# Patient Record
Sex: FEMALE | Race: WHITE
[De-identification: names, ages, dates, MRNs, and addresses within clinical notes are randomized per-mention and may not be internally consistent; named-entity substitution may affect disease eponyms.]

---

## 2018-04-06 ENCOUNTER — HOSPITAL ENCOUNTER (OUTPATIENT)
Dept: HOSPITAL 80 - F3N | Age: 33
Discharge: HOME | End: 2018-04-06
Attending: NEUROLOGICAL SURGERY
Payer: COMMERCIAL

## 2018-04-06 VITALS — DIASTOLIC BLOOD PRESSURE: 86 MMHG | SYSTOLIC BLOOD PRESSURE: 136 MMHG

## 2018-04-06 DIAGNOSIS — M51.26: ICD-10-CM

## 2018-04-06 DIAGNOSIS — M54.16: Primary | ICD-10-CM

## 2018-04-06 DIAGNOSIS — M21.372: ICD-10-CM

## 2018-04-06 PROCEDURE — 00NY0ZZ RELEASE LUMBAR SPINAL CORD, OPEN APPROACH: ICD-10-PCS | Performed by: NEUROLOGICAL SURGERY

## 2018-04-06 PROCEDURE — BR191ZZ FLUOROSCOPY OF LUMBAR SPINE USING LOW OSMOLAR CONTRAST: ICD-10-PCS | Performed by: NEUROLOGICAL SURGERY

## 2018-04-06 NOTE — POSTOPPROG
Post Op Note


Date of Operation: 04/06/18


Surgeon: JESSY Tao


Assistant: Ariella Godwin NP 


Anesthesia: GET(General Endotracheal)


Pre-op Diagnosis: HNP, lumbar stenosis 


Procedure: Left L4-5 CHLOÉ


Inf/Abcess present in the surg proc area at time of surgery?: No


Depth: Deep Incisional (Fascial)


EBL: 


Total fluids administered: see anesthesia 


Complications: 


none 





Date of Surgery: 04/06/18


Post Op Day: 0


Assessment/Plan: 


Assessment: 33 yr old s/p left L4-5 CHLOÉ for HNP





Plan:


-Discharge home when meets criteria


-Oxycodone 5mg and Robaxin 750mg x1 prior to discharge


-Please call neurosurgery with any questions/concerns 





Subjective: 


Patient waking up in PACU-left leg pain improved 


Objective: 


Waking up in PACU 


RLE 5/5, LLE 5/5 except left EHL 4/5


Sensation intact to light touch BLE


Dressing CDI 


Appropriate Neuro Check Frequency Ordered: Yes

## 2018-04-06 NOTE — GOP
[f rep st]



                                                                OPERATIVE REPORT





DATE OF OPERATION:  



SURGEON:  MICHELLE Tao MD



ASSISTANT:  Ariella Godwin NP



PREOPERATIVE DIAGNOSIS:  Left footdrop, left herniated nucleus pulposus, L4-5.



POSTOPERATIVE DIAGNOSIS:  Left footdrop, left herniated nucleus pulposus, L4-5.



PROCEDURE PERFORMED:  Left L4-5 hemilaminotomy with a microdiskectomy (21652), microscope, fluoroscop
y.



FINDINGS:  





ESTIMATED BLOOD LOSS:  25 cc.



INDICATIONS:  Ms. Brooks is a young woman who has a several month history of left footdrop and pain 
radiating down the left leg and it was unresponsive to conservative measures and she desired to have 
surgery.  She was having relentless pain that was not resolving and I suggested an operation.  The ri
sk of recurrent disk herniation, nerve injury and spinal fluid leak was discussed.  She knew there wa
s a slight risk of infection of the operative site.  She wanted to proceed.



DESCRIPTION OF PROCEDURE:  The patient was taken to the operating room, placed in supine position.  G
eneral anesthesia was begun.  She was flipped prone on the Reji frame.  Care was taken to pad all p
oints of contact.  Her back was sterilely prepped and draped in usual fashion.  A localizing x-ray wa
s taken.  We made a midline 18 mm incision above the L4-5 interspace.  The subcutaneous tissue was di
ssected using Bovie cautery down to the fascia.  Subperiosteal dissection was made down the left L4-5
 lamina.  A localizing x-ray was taken again.  The operating microscope was introduced.  We drilled a
 very minimal left inferior L4 laminotomy and then a rostral L5 laminotomy.  We did not interfere wit
h the facet joint.  We opened the ligamentum flavum and localized the L5 nerve root adjacent to the L
5 pedicle.  We swept it medially and underneath there was a very large free fragment of disk and we r
emoved this disk in its entirety.  There was another large piece going down in the neural foramina un
derneath the 5 root and we were able to get this out.  There was a large dorsal rent in the central p
ortion of the L4-5 disk inferiorly where the disk herniation came from and we could not quite get our
 pituitary into this hole.  We opened the inferior lateral portion of the disk just adjacent to this 
and this allowed us to insert a pituitary into the disk and we sub annular fragments that were causin
g the anulus to bolt in this location.  We then irrigated the disk space with antibiotic saline solut
ion.  There was no mass effect any longer on the left L5 nerve root.  We then placed some Depo-Medrol
 with Marcaine down over the region and then closed the incision in multiple layers using Vicryl sutu
re.  Steri-Strips were applied.  The patient was reversed from anesthesia, extubated, and transferred
 to the recovery room in stable condition.



COMPLICATIONS:  None.





Job #:  [id number]

## 2018-04-06 NOTE — PDANEPAE
ANE History of Present Illness





here for [facility]





ANE Past Medical History





- Cardiovascular History


Hx Hypertension: No


Hx Arrhythmias: No


Hx Chest Pain: No


Hx Coronary Artery / Peripheral Vascular Disease: No


Hx CHF / Valvular Disease: No


Hx Palpitations: No





- Pulmonary History


Hx COPD: No


Hx Asthma/Reactive Airway Disease: No


Hx Recent Upper Respiratory Infection: No


Hx Oxygen in Use at Home: No


Hx Sleep Apnea: No


Sleep Apnea Screening Result - Last Documented: Negative





- Neurologic History


Hx Cerebrovascular Accident: No


Hx Seizures: No


Hx Dementia: No





- Endocrine History


Hx Diabetes: No





- Renal History


Hx Renal Disorders: No





- Liver History


Hx Hepatic Disorders: No





- Neurological & Psychiatric Hx


Hx Neurological and Psychiatric Disorders: No





- Cancer History


Hx Cancer: No





- Congenital Disorder History


Hx Congenital Disorders: No





- GI History


Hx Gastrointestinal Disorders: No





- Other Health History


Other Health History: wears contacts/ glasses





- Chronic Pain History


Chronic Pain: No





- Surgical History


Prior Surgeries: wisdom teeth removed





ANE Review of Systems


Review of systems is: negative


Review of Systems: 








- Exercise capacity


Exercise capacity: >=4 METS


METS (RN): 4 METS





ANE Patient History





- Allergies


Allergies/Adverse Reactions: 








amoxicillin Allergy (Verified 04/06/18 06:40)


 Hives








- Home Medications


Home medications: home medication list seen and reviewed


Home Medications: 








Acetaminophen [Tylenol 325mg (*)] 325 mg PO DAILY PRN 04/04/18 [Last Taken 04/04 /18]


Gabapentin [Neurontin 300 MG (*)] 600 mg PO HS PRN 04/04/18 [Last Taken 04/05/18

]


Multivitamins [Multivitamin (*)] 1 each PO DAILY 04/04/18 [Last Taken 04/05/18]








- NPO status


NPO Status: no food or drink >8 hours


NPO Since - Liquids (Date): 04/06/18


NPO Since - Liquids (Time): 04:00


NPO Since - Solids (Date): 04/05/18


NPO Since - Solids (Time): 18:30





- Anes Hx


Anes Hx: no prior problems





- Smoking Hx


Smoking Status: Never smoked





- Family Anes Hx


Family Hx Anesthesia Complications: none





ANE Labs/Vital Signs





- Vital Signs


Vital Signs: reviewed preoperatively; see RN documention for details


Blood Pressure: 150/96


Heart Rate: 102


Respiratory Rate: 16


O2 Sat (%): 97


Height: 167.64 cm


Weight: 68.04 kg





ANE Physical Exam





- Airway


Neck exam: FROM


Mallampati Score: Class 1





- Pulmonary


Pulmonary: no respiratory distress





- Cardiovascular


Cardiovascular: regular rate and rhythym





- ASA Status


ASA Status: I





ANE Anesthesia Plan


Anesthesia Plan: general endotracheal anesthesia

## 2018-09-10 ENCOUNTER — HOSPITAL ENCOUNTER (OUTPATIENT)
Dept: HOSPITAL 80 - FIMAGING | Age: 33
End: 2018-09-10
Attending: OBSTETRICS & GYNECOLOGY
Payer: COMMERCIAL

## 2018-09-10 DIAGNOSIS — O09.522: Primary | ICD-10-CM

## 2018-09-10 DIAGNOSIS — O09.292: ICD-10-CM

## 2018-09-10 DIAGNOSIS — Z3A.18: ICD-10-CM

## 2018-10-16 ENCOUNTER — HOSPITAL ENCOUNTER (OUTPATIENT)
Dept: HOSPITAL 80 - FIMAGING | Age: 33
End: 2018-10-16
Attending: OBSTETRICS & GYNECOLOGY
Payer: COMMERCIAL

## 2018-10-16 DIAGNOSIS — Z3A.23: ICD-10-CM

## 2018-10-16 DIAGNOSIS — O09.292: Primary | ICD-10-CM

## 2018-11-15 ENCOUNTER — HOSPITAL ENCOUNTER (OUTPATIENT)
Dept: HOSPITAL 80 - FIMAGING | Age: 33
End: 2018-11-15
Attending: OBSTETRICS & GYNECOLOGY
Payer: COMMERCIAL

## 2018-11-15 DIAGNOSIS — O09.523: Primary | ICD-10-CM

## 2018-11-15 DIAGNOSIS — Z3A.28: ICD-10-CM

## 2018-11-15 DIAGNOSIS — O14.90: ICD-10-CM

## 2018-12-10 ENCOUNTER — HOSPITAL ENCOUNTER (OUTPATIENT)
Dept: HOSPITAL 80 - FIMAGING | Age: 33
End: 2018-12-10
Attending: OBSTETRICS & GYNECOLOGY
Payer: COMMERCIAL

## 2018-12-10 DIAGNOSIS — O09.293: ICD-10-CM

## 2018-12-10 DIAGNOSIS — Z3A.31: ICD-10-CM

## 2018-12-10 DIAGNOSIS — O09.523: Primary | ICD-10-CM

## 2019-01-08 ENCOUNTER — HOSPITAL ENCOUNTER (OUTPATIENT)
Dept: HOSPITAL 80 - FIMAGING | Age: 34
End: 2019-01-08
Attending: OBSTETRICS & GYNECOLOGY
Payer: COMMERCIAL

## 2019-01-08 DIAGNOSIS — O09.523: Primary | ICD-10-CM

## 2019-01-08 DIAGNOSIS — Z3A.35: ICD-10-CM

## 2019-01-08 DIAGNOSIS — O09.293: ICD-10-CM
